# Patient Record
Sex: MALE | Race: WHITE
[De-identification: names, ages, dates, MRNs, and addresses within clinical notes are randomized per-mention and may not be internally consistent; named-entity substitution may affect disease eponyms.]

---

## 2018-05-15 ENCOUNTER — HOSPITAL ENCOUNTER (EMERGENCY)
Dept: HOSPITAL 17 - NEPD | Age: 38
LOS: 1 days | Discharge: HOME | End: 2018-05-16
Payer: COMMERCIAL

## 2018-05-15 VITALS
DIASTOLIC BLOOD PRESSURE: 97 MMHG | TEMPERATURE: 97.9 F | HEART RATE: 98 BPM | SYSTOLIC BLOOD PRESSURE: 158 MMHG | RESPIRATION RATE: 15 BRPM | OXYGEN SATURATION: 98 %

## 2018-05-15 VITALS — BODY MASS INDEX: 24.05 KG/M2 | HEIGHT: 74 IN | WEIGHT: 187.39 LBS

## 2018-05-15 DIAGNOSIS — Z79.899: ICD-10-CM

## 2018-05-15 DIAGNOSIS — K21.9: ICD-10-CM

## 2018-05-15 DIAGNOSIS — F19.20: Primary | ICD-10-CM

## 2018-05-15 LAB
ALBUMIN SERPL-MCNC: 4.1 GM/DL (ref 3.4–5)
ALP SERPL-CCNC: 70 U/L (ref 45–117)
ALT SERPL-CCNC: 29 U/L (ref 12–78)
APAP SERPL-MCNC: (no result) MCG/ML (ref 10–30)
AST SERPL-CCNC: 19 U/L (ref 15–37)
BASOPHILS # BLD AUTO: 0 TH/MM3 (ref 0–0.2)
BASOPHILS NFR BLD: 0.7 % (ref 0–2)
BILIRUB SERPL-MCNC: 0.8 MG/DL (ref 0.2–1)
BUN SERPL-MCNC: 18 MG/DL (ref 7–18)
CALCIUM SERPL-MCNC: 9.3 MG/DL (ref 8.5–10.1)
CHLORIDE SERPL-SCNC: 107 MEQ/L (ref 98–107)
CREAT SERPL-MCNC: 1.26 MG/DL (ref 0.6–1.3)
EOSINOPHIL # BLD: 0.1 TH/MM3 (ref 0–0.4)
EOSINOPHIL NFR BLD: 1 % (ref 0–4)
ERYTHROCYTE [DISTWIDTH] IN BLOOD BY AUTOMATED COUNT: 15.3 % (ref 11.6–17.2)
GFR SERPLBLD BASED ON 1.73 SQ M-ARVRAT: 65 ML/MIN (ref 89–?)
GLUCOSE SERPL-MCNC: 89 MG/DL (ref 74–106)
HCO3 BLD-SCNC: 24.7 MEQ/L (ref 21–32)
HCT VFR BLD CALC: 41.9 % (ref 39–51)
HGB BLD-MCNC: 14 GM/DL (ref 13–17)
LYMPHOCYTES # BLD AUTO: 1.5 TH/MM3 (ref 1–4.8)
LYMPHOCYTES NFR BLD AUTO: 24.3 % (ref 9–44)
MCH RBC QN AUTO: 28.5 PG (ref 27–34)
MCHC RBC AUTO-ENTMCNC: 33.5 % (ref 32–36)
MCV RBC AUTO: 85.1 FL (ref 80–100)
MONOCYTE #: 0.5 TH/MM3 (ref 0–0.9)
MONOCYTES NFR BLD: 8.1 % (ref 0–8)
NEUTROPHILS # BLD AUTO: 4 TH/MM3 (ref 1.8–7.7)
NEUTROPHILS NFR BLD AUTO: 65.9 % (ref 16–70)
PLATELET # BLD: 308 TH/MM3 (ref 150–450)
PMV BLD AUTO: 8.5 FL (ref 7–11)
PROT SERPL-MCNC: 8.3 GM/DL (ref 6.4–8.2)
RBC # BLD AUTO: 4.93 MIL/MM3 (ref 4.5–5.9)
SODIUM SERPL-SCNC: 141 MEQ/L (ref 136–145)
WBC # BLD AUTO: 6.1 TH/MM3 (ref 4–11)

## 2018-05-15 PROCEDURE — 99284 EMERGENCY DEPT VISIT MOD MDM: CPT

## 2018-05-15 PROCEDURE — 85025 COMPLETE CBC W/AUTO DIFF WBC: CPT

## 2018-05-15 PROCEDURE — 84443 ASSAY THYROID STIM HORMONE: CPT

## 2018-05-15 PROCEDURE — 80053 COMPREHEN METABOLIC PANEL: CPT

## 2018-05-15 PROCEDURE — 80307 DRUG TEST PRSMV CHEM ANLYZR: CPT

## 2018-05-15 NOTE — PD
HPI


Chief Complaint:  Psychiatric Symptoms


Time Seen by Provider:  14:04


Travel History


International Travel<30 days:  No


Contact w/Intl Traveler<30days:  No


Traveled to known affect area:  No





History of Present Illness


HPI


37-year-old male presents to the emergency department under Baker act.  

According to Nuno act report ""Sanket Moreland has been up for numerous days 

and has threatened to cause harm to his mother, specifically killing her if he 

had "the balls."  His mother stated Sanket has not eaten in days either.  

Sanket claims he is eaten but admits to being awake for days claiming he is 

spewing to and spreading the word of God.  Mood swings caring being interviewed 

and secured.  Mother says he has been on mildly recently.""  On my examination 

the patient says what he said was a bunch of meaningless words.  He says his 

mom is "popping Valiums" and then turning around and telling everyone that he 

is a drug addict.  He denies suicidal or homicidal ideations.  Denies auditory 

or visual hallucinations.  Does admit to popping "a little bit of Melany 3 days 

ago."  But says his current situation has nothing to do with that.  Denies IV 

drug use.  Denies alcohol use.  Denies tobacco use.  Unknown duration.  Unknown 

onset.  Symptoms are moderate to severe in severity.  No known aggravating or 

relieving factors.  No primary care provider.  No known allergies.  Denies 

significant past medical history.  Has no other medical complaints.  Denies 

chest pain, shortness breath, abdominal pain, vomiting, change in urine or 

stool.  No other modifying factors or associated signs and symptoms.





PFSH


Past Medical History


Arthritis:  Yes (RT FOOT)


Cardiovascular Problems:  No


Diminished Hearing:  No


Endocrine:  No


Gastrointestinal Disorders:  Yes


GERD:  Yes


Genitourinary:  No


Immune Disorder:  No


Musculoskeletal:  Yes


Neurologic:  No


Psychiatric:  No


Reproductive:  No


Respiratory:  No





Past Surgical History


Other Surgery:  Yes





Social History


Alcohol Use:  Yes (occasionally)


Tobacco Use:  Yes (one pack per day)


Substance Use:  Yes (MELANY BROWN)





Allergies-Medications


(Allergen,Severity, Reaction):  


Coded Allergies:  


     No Known Allergies (Verified , 8/24/16)


Reported Meds & Prescriptions





Reported Meds & Active Scripts


Active


Reported


Omeprazole 10 Mg Cap 10 Mg PO DAILY








Review of Systems


Except as stated in HPI:  all other systems reviewed are Neg





Physical Exam


Narrative


GENERAL: Well-nourished, well-developed  male patient, in no acute 

distress


SKIN: Warm and dry.


HEAD: Atraumatic. Normocephalic. 


EYES: Pupils equal and round.


ENT: Mucosa pink and moist.


NECK: Supple.  Trachea midline.  


CARDIOVASCULAR: Regular rate and rhythm.  No murmur appreciated.  


RESPIRATORY: No accessory muscle use. Clear to auscultation. Breath sounds 

equal bilaterally. 


GASTROINTESTINAL: Abdomen soft, non-tender, nondistended. Hepatic and splenic 

margins not palpable.  Bowel sounds are active 4 quadrants.  


MUSCULOSKELETAL: No obvious deformities. No clubbing.  No cyanosis.  No edema. 


NEUROLOGICAL: Awake and alert.  Oriented 3.  No obvious cranial nerve 

deficits.  Motor grossly within normal limits. Normal speech.  Moves all 

extremities.  5/5 strength to all extremities.


PSYCHIATRIC: No hallucinations.





Data


Data


Last Documented VS





Vital Signs








  Date Time  Temp Pulse Resp B/P (MAP) Pulse Ox O2 Delivery O2 Flow Rate FiO2


 


5/15/18 13:59 97.9 98 15 158/97 (117) 98 Room Air  








Orders





 Orders


Complete Blood Count With Diff (5/15/18 14:04)


Comprehensive Metabolic Panel (5/15/18 14:04)


Thyroid Stimulating Hormone (5/15/18 14:04)


Psych Screen (5/15/18 14:04)


Drug Screen, Random Urine (5/15/18 14:04)


Alcohol (Ethanol) (5/15/18 14:04)


Salicylates (Aspirin) (5/15/18 14:04)


Tylenol (Acetaminophen) (5/15/18 14:04)





Labs





Laboratory Tests








Test


  5/15/18


14:25


 


White Blood Count 6.1 TH/MM3 


 


Red Blood Count 4.93 MIL/MM3 


 


Hemoglobin 14.0 GM/DL 


 


Hematocrit 41.9 % 


 


Mean Corpuscular Volume 85.1 FL 


 


Mean Corpuscular Hemoglobin 28.5 PG 


 


Mean Corpuscular Hemoglobin


Concent 33.5 % 


 


 


Red Cell Distribution Width 15.3 % 


 


Platelet Count 308 TH/MM3 


 


Mean Platelet Volume 8.5 FL 


 


Neutrophils (%) (Auto) 65.9 % 


 


Lymphocytes (%) (Auto) 24.3 % 


 


Monocytes (%) (Auto) 8.1 % 


 


Eosinophils (%) (Auto) 1.0 % 


 


Basophils (%) (Auto) 0.7 % 


 


Neutrophils # (Auto) 4.0 TH/MM3 


 


Lymphocytes # (Auto) 1.5 TH/MM3 


 


Monocytes # (Auto) 0.5 TH/MM3 


 


Eosinophils # (Auto) 0.1 TH/MM3 


 


Basophils # (Auto) 0.0 TH/MM3 


 


CBC Comment DIFF FINAL 


 


Differential Comment  











MDM


Medical Decision Making


Medical Screen Exam Complete:  Yes


Emergency Medical Condition:  Yes


Medical Record Reviewed:  Yes


Differential Diagnosis


Substance use disorder, homicidal ideation, homicidal threat, medical clearance 

for psychiatric admission


Narrative Course


Patient presents under a Nuno act.  Physical examination and vital signs are 

essentially unremarkable.  Patient has no medical complaints to report.





Psych screen has been ordered.





If the laboratory results are unremarkable, the patient will be medically 

cleared for psychiatric evaluation and disposition.





Diagnosis





 Primary Impression:  


 Medical clearance for psychiatric admission


Condition:  Stable











Litzy Grijalva May 15, 2018 15:00

## 2018-05-16 VITALS
RESPIRATION RATE: 18 BRPM | DIASTOLIC BLOOD PRESSURE: 62 MMHG | HEART RATE: 73 BPM | SYSTOLIC BLOOD PRESSURE: 116 MMHG | OXYGEN SATURATION: 98 %

## 2018-05-16 VITALS
SYSTOLIC BLOOD PRESSURE: 105 MMHG | OXYGEN SATURATION: 97 % | HEART RATE: 68 BPM | DIASTOLIC BLOOD PRESSURE: 69 MMHG | RESPIRATION RATE: 17 BRPM

## 2018-05-16 NOTE — PD
Physical Exam


Date Seen by Provider:  May 16, 2018


Time Seen by Provider:  09:41





Data


Data


Last Documented VS





Vital Signs








  Date Time  Temp Pulse Resp B/P (MAP) Pulse Ox O2 Delivery O2 Flow Rate FiO2


 


5/16/18 06:32  68 17 105/69 (81) 97 Room Air  


 


5/15/18 13:59 97.9       








Orders





 Orders


Complete Blood Count With Diff (5/15/18 14:04)


Comprehensive Metabolic Panel (5/15/18 14:04)


Thyroid Stimulating Hormone (5/15/18 14:04)


Psych Screen (5/15/18 14:04)


Drug Screen, Random Urine (5/15/18 14:04)


Alcohol (Ethanol) (5/15/18 14:04)


Salicylates (Aspirin) (5/15/18 14:04)


Tylenol (Acetaminophen) (5/15/18 14:04)


Diet Regular Basic (5/15/18 Dinner)


Diet Regular Basic (5/16/18 Breakfast)





Labs





Laboratory Tests








Test


  5/15/18


08:15 5/15/18


14:25


 


Urine Opiates Screen NEG  


 


Urine Barbiturates Screen NEG  


 


Urine Amphetamines Screen POS  


 


Urine Benzodiazepines Screen NEG  


 


Urine Cocaine Screen NEG  


 


Urine Cannabinoids Screen NEG  


 


White Blood Count  6.1 TH/MM3 


 


Red Blood Count  4.93 MIL/MM3 


 


Hemoglobin  14.0 GM/DL 


 


Hematocrit  41.9 % 


 


Mean Corpuscular Volume  85.1 FL 


 


Mean Corpuscular Hemoglobin  28.5 PG 


 


Mean Corpuscular Hemoglobin


Concent 


  33.5 % 


 


 


Red Cell Distribution Width  15.3 % 


 


Platelet Count  308 TH/MM3 


 


Mean Platelet Volume  8.5 FL 


 


Neutrophils (%) (Auto)  65.9 % 


 


Lymphocytes (%) (Auto)  24.3 % 


 


Monocytes (%) (Auto)  8.1 % 


 


Eosinophils (%) (Auto)  1.0 % 


 


Basophils (%) (Auto)  0.7 % 


 


Neutrophils # (Auto)  4.0 TH/MM3 


 


Lymphocytes # (Auto)  1.5 TH/MM3 


 


Monocytes # (Auto)  0.5 TH/MM3 


 


Eosinophils # (Auto)  0.1 TH/MM3 


 


Basophils # (Auto)  0.0 TH/MM3 


 


CBC Comment  DIFF FINAL 


 


Differential Comment   


 


Blood Urea Nitrogen  18 MG/DL 


 


Creatinine  1.26 MG/DL 


 


Random Glucose  89 MG/DL 


 


Total Protein  8.3 GM/DL 


 


Albumin  4.1 GM/DL 


 


Calcium Level  9.3 MG/DL 


 


Alkaline Phosphatase  70 U/L 


 


Aspartate Amino Transf


(AST/SGOT) 


  19 U/L 


 


 


Alanine Aminotransferase


(ALT/SGPT) 


  29 U/L 


 


 


Total Bilirubin  0.8 MG/DL 


 


Sodium Level  141 MEQ/L 


 


Potassium Level  3.7 MEQ/L 


 


Chloride Level  107 MEQ/L 


 


Carbon Dioxide Level  24.7 MEQ/L 


 


Anion Gap  9 MEQ/L 


 


Estimat Glomerular Filtration


Rate 


  65 ML/MIN 


 


 


Thyroid Stimulating Hormone


3rd Gen 


  1.940 uIU/ML 


 


 


Salicylates Level


  


  LESS THAN 1.7


MG/DL


 


Acetaminophen Level


  


  LESS THAN 2.0


MCG/ML


 


Ethyl Alcohol Level


  


  LESS THAN 3


MG/DL











MDM


Supervised Visit with ROSARIO:  No


Narrative Course


37-year-old male brought into the ED under Baker act.


He was initially seen by MICHAEL Ramon and medically cleared.


He was then seen by Dr. Dolan, psychiatry, and Baker act was lifted.


Plan is for patient to follow up with [facility] for substance abuse.


The patient is stable and discharged home.


Diagnosis





 Primary Impression:  


 Medical clearance for psychiatric admission


 Additional Impression:  


 Polysubstance dependence


Referrals:  


[facility]





***Additional Instruction:  


Follow up with [facility] for outpatient treatment of substance abuse.


Return to the ED for any urgent/ emergent medical concern.


Disposition:  01 DISCHARGE HOME


Condition:  Stable











Christal Brunner May 16, 2018 09:42

## 2018-05-16 NOTE — PD.PSY.CON
Provisional Diagnosis


Admission Date





Axis I.


Substance-induced mood disorder, hallucinogens use disorder





History of Present Illness


Service


Psychiatry


Consult Requested By


ER


Reason for Consult


Psychiatry


Primary Care Physician


No Primary Care Physician


HPI


Patient was seen this morning at 7:30 AM





The patient is 37-year-old man, single, domiciled with his mother and father in 

[place], unemployed, with psychiatric history of polysubstance dependence 

including hallucinogens, amphetamines, cocaine, marijuana, no previous 

psychiatric hospitalizations, no previous suicide attempts, no significant 

medical history, who presents to the emergency department under Baker act.  

According to Nuno act report ""Sanket Moreland has been up for numerous days 

and has threatened to cause harm to his mother, specifically killing her if he 

had "the balls."  His mother stated Sanket has not eaten in days either.  

Sanket claims he is eaten but admits to being awake for days claiming he is 

spewing to and spreading the word of God.  Mood swings caring being interviewed 

and secured.  Mother says he has been on mildly recently.""  On my examination 

the patient says what he said was a bunch of meaningless words.  He says his 

mom is "popping Valiums" and then turning around and telling everyone that he 

is a drug addict.  He denies suicidal or homicidal ideations.  Denies auditory 

or visual hallucinations.  Does admit to popping "a little bit of Regina 3 days 

ago."  But says his current situation has nothing to do with that.  Denies IV 

drug use.  Denies alcohol use.  Denies tobacco use.  Unknown duration.  Unknown 

onset.  Symptoms are moderate to severe in severity.  No known aggravating or 

relieving factors.  No primary care provider.  No known allergies.  Denies 

significant past medical history.  Has no other medical complaints.  Denies 

chest pain, shortness breath, abdominal pain, vomiting, change in urine or 

stool.  No other modifying factors or associated signs and symptoms.





Past Family Social History


Coded Allergies:  


     No Known Allergies (Verified , 8/24/16)


Reported Medications


Omeprazole (Omeprazole) 10 Mg Cap, 10 MG PO DAILY, #30 CAP 0 Refills


   5/15/18





Physical Exam


Vital Signs





Vital Signs








  Date Time  Temp Pulse Resp B/P (MAP) Pulse Ox O2 Delivery O2 Flow Rate FiO2


 


5/16/18 11:02        


 


5/16/18 06:32  68 17  97 Room Air  


 


5/15/18 13:59 97.9       








Lab Results











Test


  5/15/18


14:25


 


White Blood Count 6.1 TH/MM3 


 


Red Blood Count 4.93 MIL/MM3 


 


Hemoglobin 14.0 GM/DL 


 


Hematocrit 41.9 % 


 


Mean Corpuscular Volume 85.1 FL 


 


Mean Corpuscular Hemoglobin 28.5 PG 


 


Mean Corpuscular Hemoglobin


Concent 33.5 % 


 


 


Red Cell Distribution Width 15.3 % 


 


Platelet Count 308 TH/MM3 


 


Mean Platelet Volume 8.5 FL 


 


Neutrophils (%) (Auto) 65.9 % 


 


Lymphocytes (%) (Auto) 24.3 % 


 


Monocytes (%) (Auto) 8.1 % 


 


Eosinophils (%) (Auto) 1.0 % 


 


Basophils (%) (Auto) 0.7 % 


 


Neutrophils # (Auto) 4.0 TH/MM3 


 


Lymphocytes # (Auto) 1.5 TH/MM3 


 


Monocytes # (Auto) 0.5 TH/MM3 


 


Eosinophils # (Auto) 0.1 TH/MM3 


 


Basophils # (Auto) 0.0 TH/MM3 


 


CBC Comment DIFF FINAL 


 


Differential Comment  


 


Blood Urea Nitrogen 18 MG/DL 


 


Creatinine 1.26 MG/DL 


 


Random Glucose 89 MG/DL 


 


Total Protein 8.3 GM/DL 


 


Albumin 4.1 GM/DL 


 


Calcium Level 9.3 MG/DL 


 


Alkaline Phosphatase 70 U/L 


 


Aspartate Amino Transf


(AST/SGOT) 19 U/L 


 


 


Alanine Aminotransferase


(ALT/SGPT) 29 U/L 


 


 


Total Bilirubin 0.8 MG/DL 


 


Sodium Level 141 MEQ/L 


 


Potassium Level 3.7 MEQ/L 


 


Chloride Level 107 MEQ/L 


 


Carbon Dioxide Level 24.7 MEQ/L 


 


Anion Gap 9 MEQ/L 


 


Estimat Glomerular Filtration


Rate 65 ML/MIN 


 


 


Thyroid Stimulating Hormone


3rd Gen 1.940 uIU/ML 


 


 


Salicylates Level


  LESS THAN 1.7


MG/DL


 


Acetaminophen Level


  LESS THAN 2.0


MCG/ML


 


Ethyl Alcohol Level


  LESS THAN 3


MG/DL











Mental Status Examination


Appearance:  Appropriate


Consciousness:  Alert


Orientation:  x4


Motor Activity:  Normal gait


Speech:  Unremarkable


Language:  Adequate


Fund of Knowledge:  Adequate


Attention and Concentration:  Adequate


Memory:  Unremarkable


Mood:  Appropriate


Affect:  Appropriate


Thought Process & Associations:  Intact


Thought Content:  Appropriate


Hallucination Type:  None


Delusion Type:  None


Suicidal Ideation:  No


Suicidal Plan:  No


Suicidal Intention:  No


Homicidal Ideation:  No


Homicidal Plan:  No


Homicidal Intention:  No


Insight:  Adequate


Judgment:  Adequate





Assessment & Plan


Problem List:  


(1) Substance induced mood disorder


ICD Codes:  F19.94 - Other psychoactive substance use, unspecified with 

psychoactive substance-induced mood disorder


Assessment & Plan:  Psychiatric evaluation today the patient is calm, 

cooperative, logical, coherent and relevant.  Clinically sober.  He denies 

symptomatology of depression, anxiety, gideon and psychosis.  He denies suicidal 

and homicidal ideation, he denies visual and auditory hallucinations.  Recent 

aggressive behavior with his mother is most probably the result of substance 

intoxication, the patient admitted using Regina, but also due to character 

structure.  He does not meet criteria for involuntary psychiatric admission.  

Brief supportive psychotherapy provided. 





Assessment & Plan


Estimated LOS:  Hugh Thomas MD May 16, 2018 12:49